# Patient Record
Sex: MALE
[De-identification: names, ages, dates, MRNs, and addresses within clinical notes are randomized per-mention and may not be internally consistent; named-entity substitution may affect disease eponyms.]

---
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
## 2023-10-28 ENCOUNTER — APPOINTMENT (OUTPATIENT)
Dept: MRI IMAGING | Facility: HOSPITAL | Age: 18
End: 2023-10-28
Attending: EMERGENCY MEDICINE
Payer: COMMERCIAL

## 2023-10-28 ENCOUNTER — APPOINTMENT (OUTPATIENT)
Dept: GENERAL RADIOLOGY | Facility: HOSPITAL | Age: 18
End: 2023-10-28
Attending: EMERGENCY MEDICINE
Payer: COMMERCIAL

## 2023-10-28 ENCOUNTER — HOSPITAL ENCOUNTER (EMERGENCY)
Facility: HOSPITAL | Age: 18
Discharge: HOME | End: 2023-10-28
Attending: EMERGENCY MEDICINE
Payer: COMMERCIAL

## 2023-10-28 VITALS
OXYGEN SATURATION: 98 % | HEART RATE: 70 BPM | BODY MASS INDEX: 21 KG/M2 | HEIGHT: 71 IN | RESPIRATION RATE: 17 BRPM | WEIGHT: 150 LBS | DIASTOLIC BLOOD PRESSURE: 61 MMHG | SYSTOLIC BLOOD PRESSURE: 130 MMHG | TEMPERATURE: 99 F

## 2023-10-28 DIAGNOSIS — R20.2 PARESTHESIAS: ICD-10-CM

## 2023-10-28 DIAGNOSIS — R00.2 PALPITATIONS: ICD-10-CM

## 2023-10-28 DIAGNOSIS — G43.909 MIGRAINE WITHOUT STATUS MIGRAINOSUS, NOT INTRACTABLE, UNSPECIFIED MIGRAINE TYPE: Primary | ICD-10-CM

## 2023-10-28 LAB
ALBUMIN SERPL-MCNC: 4.4 G/DL (ref 3.4–5)
ALBUMIN/GLOB SERPL: 1.3 {RATIO} (ref 1–2)
ALP LIVER SERPL-CCNC: 60 U/L
ALT SERPL-CCNC: 57 U/L
ANION GAP SERPL CALC-SCNC: 6 MMOL/L (ref 0–18)
AST SERPL-CCNC: 27 U/L (ref 15–37)
BASOPHILS # BLD AUTO: 0.02 X10(3) UL (ref 0–0.2)
BASOPHILS NFR BLD AUTO: 0.3 %
BILIRUB SERPL-MCNC: 1.1 MG/DL (ref 0.1–2)
BUN BLD-MCNC: 9 MG/DL (ref 7–18)
CALCIUM BLD-MCNC: 9.4 MG/DL (ref 8.5–10.1)
CHLORIDE SERPL-SCNC: 103 MMOL/L (ref 98–112)
CO2 SERPL-SCNC: 27 MMOL/L (ref 21–32)
CREAT BLD-MCNC: 1.03 MG/DL
EGFRCR SERPLBLD CKD-EPI 2021: 107 ML/MIN/1.73M2 (ref 60–?)
EOSINOPHIL # BLD AUTO: 0.25 X10(3) UL (ref 0–0.7)
EOSINOPHIL NFR BLD AUTO: 4 %
ERYTHROCYTE [DISTWIDTH] IN BLOOD BY AUTOMATED COUNT: 11.2 %
GLOBULIN PLAS-MCNC: 3.4 G/DL (ref 2.8–4.4)
GLUCOSE BLD-MCNC: 89 MG/DL (ref 70–99)
HCT VFR BLD AUTO: 44.3 %
HGB BLD-MCNC: 15.9 G/DL
IMM GRANULOCYTES # BLD AUTO: 0.01 X10(3) UL (ref 0–1)
IMM GRANULOCYTES NFR BLD: 0.2 %
LYMPHOCYTES # BLD AUTO: 2.41 X10(3) UL (ref 1–4)
LYMPHOCYTES NFR BLD AUTO: 38.6 %
MCH RBC QN AUTO: 30.6 PG (ref 26–34)
MCHC RBC AUTO-ENTMCNC: 35.9 G/DL (ref 31–37)
MCV RBC AUTO: 85.4 FL
MONOCYTES # BLD AUTO: 0.48 X10(3) UL (ref 0.1–1)
MONOCYTES NFR BLD AUTO: 7.7 %
NEUTROPHILS # BLD AUTO: 3.07 X10 (3) UL (ref 1.5–7.7)
NEUTROPHILS # BLD AUTO: 3.07 X10(3) UL (ref 1.5–7.7)
NEUTROPHILS NFR BLD AUTO: 49.2 %
OSMOLALITY SERPL CALC.SUM OF ELEC: 280 MOSM/KG (ref 275–295)
PLATELET # BLD AUTO: 267 10(3)UL (ref 150–450)
POTASSIUM SERPL-SCNC: 3.4 MMOL/L (ref 3.5–5.1)
PROT SERPL-MCNC: 7.8 G/DL (ref 6.4–8.2)
RBC # BLD AUTO: 5.19 X10(6)UL
SODIUM SERPL-SCNC: 136 MMOL/L (ref 136–145)
TROPONIN I HIGH SENSITIVITY: 4 NG/L
TSI SER-ACNC: 1.25 MIU/ML (ref 0.36–3.74)
WBC # BLD AUTO: 6.2 X10(3) UL (ref 4–11)

## 2023-10-28 PROCEDURE — 99285 EMERGENCY DEPT VISIT HI MDM: CPT

## 2023-10-28 PROCEDURE — 71045 X-RAY EXAM CHEST 1 VIEW: CPT | Performed by: EMERGENCY MEDICINE

## 2023-10-28 PROCEDURE — 84443 ASSAY THYROID STIM HORMONE: CPT | Performed by: EMERGENCY MEDICINE

## 2023-10-28 PROCEDURE — 84484 ASSAY OF TROPONIN QUANT: CPT | Performed by: EMERGENCY MEDICINE

## 2023-10-28 PROCEDURE — 70546 MR ANGIOGRAPH HEAD W/O&W/DYE: CPT | Performed by: EMERGENCY MEDICINE

## 2023-10-28 PROCEDURE — 70549 MR ANGIOGRAPH NECK W/O&W/DYE: CPT | Performed by: EMERGENCY MEDICINE

## 2023-10-28 PROCEDURE — 93005 ELECTROCARDIOGRAM TRACING: CPT

## 2023-10-28 PROCEDURE — 80053 COMPREHEN METABOLIC PANEL: CPT | Performed by: EMERGENCY MEDICINE

## 2023-10-28 PROCEDURE — 70553 MRI BRAIN STEM W/O & W/DYE: CPT | Performed by: EMERGENCY MEDICINE

## 2023-10-28 PROCEDURE — 93010 ELECTROCARDIOGRAM REPORT: CPT

## 2023-10-28 PROCEDURE — 85025 COMPLETE CBC W/AUTO DIFF WBC: CPT | Performed by: EMERGENCY MEDICINE

## 2023-10-28 PROCEDURE — A9575 INJ GADOTERATE MEGLUMI 0.1ML: HCPCS

## 2023-10-28 PROCEDURE — 36415 COLL VENOUS BLD VENIPUNCTURE: CPT

## 2023-10-28 RX ORDER — GADOTERATE MEGLUMINE 376.9 MG/ML
15 INJECTION INTRAVENOUS
Status: COMPLETED | OUTPATIENT
Start: 2023-10-28 | End: 2023-10-28

## 2023-10-28 NOTE — ED INITIAL ASSESSMENT (HPI)
Pt reports an hour and a half ago while at work feeling his left side of his face felt numb and feeling palpitations. Pt states numbness to left side of his face, arm and shoulder lasted about 20 minutes. Denies numbness or tingling at this time. Denies any pain, A/Ox4 clear speech.

## 2023-10-29 LAB
ATRIAL RATE: 78 BPM
P AXIS: 43 DEGREES
P-R INTERVAL: 158 MS
Q-T INTERVAL: 360 MS
QRS DURATION: 106 MS
QTC CALCULATION (BEZET): 410 MS
R AXIS: 78 DEGREES
T AXIS: 52 DEGREES
VENTRICULAR RATE: 78 BPM

## 2024-02-14 ENCOUNTER — TELEPHONE (OUTPATIENT)
Dept: NEUROLOGY | Facility: CLINIC | Age: 19
End: 2024-02-14

## 2024-07-21 LAB
ALBUMIN SERPL-MCNC: 4.8 G/DL (ref 3.2–4.8)
ALBUMIN/GLOB SERPL: 1.7 {RATIO} (ref 1–2)
ALP LIVER SERPL-CCNC: 67 U/L
ALT SERPL-CCNC: 27 U/L
ANION GAP SERPL CALC-SCNC: 5 MMOL/L (ref 0–18)
AST SERPL-CCNC: 27 U/L (ref ?–34)
BASOPHILS # BLD AUTO: 0.04 X10(3) UL (ref 0–0.2)
BASOPHILS NFR BLD AUTO: 0.6 %
BILIRUB SERPL-MCNC: 1.2 MG/DL (ref 0.3–1.2)
BUN BLD-MCNC: 8 MG/DL (ref 9–23)
CALCIUM BLD-MCNC: 9.4 MG/DL (ref 8.7–10.4)
CHLORIDE SERPL-SCNC: 104 MMOL/L (ref 98–112)
CO2 SERPL-SCNC: 29 MMOL/L (ref 21–32)
CREAT BLD-MCNC: 1.18 MG/DL
EGFRCR SERPLBLD CKD-EPI 2021: 90 ML/MIN/1.73M2 (ref 60–?)
EOSINOPHIL # BLD AUTO: 0.27 X10(3) UL (ref 0–0.7)
EOSINOPHIL NFR BLD AUTO: 4.2 %
ERYTHROCYTE [DISTWIDTH] IN BLOOD BY AUTOMATED COUNT: 11.5 %
GLOBULIN PLAS-MCNC: 2.8 G/DL (ref 2.8–4.4)
GLUCOSE BLD-MCNC: 92 MG/DL (ref 70–99)
HCT VFR BLD AUTO: 46.3 %
HGB BLD-MCNC: 16.4 G/DL
IMM GRANULOCYTES # BLD AUTO: 0.02 X10(3) UL (ref 0–1)
IMM GRANULOCYTES NFR BLD: 0.3 %
LYMPHOCYTES # BLD AUTO: 2.27 X10(3) UL (ref 1–4)
LYMPHOCYTES NFR BLD AUTO: 35.1 %
MCH RBC QN AUTO: 31.2 PG (ref 26–34)
MCHC RBC AUTO-ENTMCNC: 35.4 G/DL (ref 31–37)
MCV RBC AUTO: 88.2 FL
MONOCYTES # BLD AUTO: 0.45 X10(3) UL (ref 0.1–1)
MONOCYTES NFR BLD AUTO: 7 %
NEUTROPHILS # BLD AUTO: 3.41 X10 (3) UL (ref 1.5–7.7)
NEUTROPHILS # BLD AUTO: 3.41 X10(3) UL (ref 1.5–7.7)
NEUTROPHILS NFR BLD AUTO: 52.8 %
OSMOLALITY SERPL CALC.SUM OF ELEC: 284 MOSM/KG (ref 275–295)
PLATELET # BLD AUTO: 288 10(3)UL (ref 150–450)
POTASSIUM SERPL-SCNC: 4.3 MMOL/L (ref 3.5–5.1)
PROT SERPL-MCNC: 7.6 G/DL (ref 5.7–8.2)
RBC # BLD AUTO: 5.25 X10(6)UL
SODIUM SERPL-SCNC: 138 MMOL/L (ref 136–145)
WBC # BLD AUTO: 6.5 X10(3) UL (ref 4–11)

## 2024-07-21 PROCEDURE — 80053 COMPREHEN METABOLIC PANEL: CPT | Performed by: EMERGENCY MEDICINE

## 2024-07-21 PROCEDURE — 80053 COMPREHEN METABOLIC PANEL: CPT

## 2024-07-21 PROCEDURE — 85025 COMPLETE CBC W/AUTO DIFF WBC: CPT | Performed by: EMERGENCY MEDICINE

## 2024-07-21 PROCEDURE — 36415 COLL VENOUS BLD VENIPUNCTURE: CPT

## 2024-07-21 PROCEDURE — 99285 EMERGENCY DEPT VISIT HI MDM: CPT

## 2024-07-21 PROCEDURE — 99284 EMERGENCY DEPT VISIT MOD MDM: CPT

## 2024-07-21 PROCEDURE — 85025 COMPLETE CBC W/AUTO DIFF WBC: CPT

## 2024-07-22 ENCOUNTER — HOSPITAL ENCOUNTER (EMERGENCY)
Facility: HOSPITAL | Age: 19
Discharge: HOME | End: 2024-07-22
Attending: EMERGENCY MEDICINE
Payer: COMMERCIAL

## 2024-07-22 ENCOUNTER — APPOINTMENT (OUTPATIENT)
Dept: CT IMAGING | Facility: HOSPITAL | Age: 19
End: 2024-07-22
Attending: EMERGENCY MEDICINE
Payer: COMMERCIAL

## 2024-07-22 VITALS
SYSTOLIC BLOOD PRESSURE: 114 MMHG | RESPIRATION RATE: 14 BRPM | DIASTOLIC BLOOD PRESSURE: 75 MMHG | BODY MASS INDEX: 20.99 KG/M2 | HEART RATE: 70 BPM | OXYGEN SATURATION: 100 % | HEIGHT: 72 IN | WEIGHT: 155 LBS | TEMPERATURE: 98 F

## 2024-07-22 DIAGNOSIS — R51.9 ACUTE NONINTRACTABLE HEADACHE, UNSPECIFIED HEADACHE TYPE: Primary | ICD-10-CM

## 2024-07-22 PROCEDURE — 70450 CT HEAD/BRAIN W/O DYE: CPT | Performed by: EMERGENCY MEDICINE

## 2024-07-22 NOTE — ED INITIAL ASSESSMENT (HPI)
Patient here with c/o burning sensation in his scalp along with nausea and sensitivity to lights.  Patient reports he has been dealing with symptoms on and off x 1 month.

## 2024-07-22 NOTE — ED PROVIDER NOTES
Patient Seen in: [facility] Emergency Department      History     Chief Complaint   Patient presents with    Headache     Stated Complaint: headache and feeling delayed speech issues x 1month , headache increased at 180*    Subjective:   HPI    19-year-old male presenting with headache.  He is complaining to a headache to the top of his head bilaterally frontal region he says he has had no sinus pressure no sinus pain no sinus drainage no sore throat fevers chills he says he knows little blurry vision when he was looking at lights he is concerned about her prompting his visit here he denies worst headache feeling of life he denies any trauma he denies any other exacerbating or relieving factors or associated symptoms    Objective:   Past Medical History:    Anesthesia complication    HARD TO AWAKEN    Anxiety    Anxiety state    Asthma (HCC)    Asthma (HCC)    Seasonal allergies and excercise induced asthma    Depression    Esophageal reflux    Esophagitis    IBS (irritable bowel syndrome)    Migraines    Problems with swallowing    depends of food due EOE    Seasonal allergies    Ulcer of abdomen wall (HCC)              Past Surgical History:   Procedure Laterality Date    Colonoscopy N/A 10/4/2019    Procedure: COLONOSCOPY;  Surgeon: Humberto Carreon MD;  Location:  ENDOSCOPY    Upper gi endoscopy,exam      X3                Social History     Socioeconomic History    Marital status: Single   Tobacco Use    Smoking status: Never    Smokeless tobacco: Current     Types: Chew   Vaping Use    Vaping status: Every Day    Start date: 3/10/2019    Last attempt to quit: 9/10/2019   Substance and Sexual Activity    Alcohol use: Yes     Alcohol/week: 2.0 standard drinks of alcohol     Types: 2 Cans of beer per week     Comment: OCCAS    Drug use: Not Currently     Types: Cannabis   Social History Narrative    ** Merged History Encounter **          Social Determinants of Health      Received from  [facility]              Review of Systems    Positive for stated Chief Complaint: Headache    Other systems are as noted in HPI.  Constitutional and vital signs reviewed.      All other systems reviewed and negative except as noted above.    Physical Exam     ED Triage Vitals   BP 07/21/24 2242 (!) 166/91   Pulse 07/21/24 2242 83   Resp 07/21/24 2242 16   Temp 07/21/24 2242 98.2 °F (36.8 °C)   Temp src --    SpO2 07/21/24 2242 98 %   O2 Device 07/22/24 0039 None (Room air)       Current Vitals:   Vital Signs  BP: (!) 166/91  Pulse: 83  Resp: 16  Temp: 98.2 °F (36.8 °C)    Oxygen Therapy  SpO2: 98 %  O2 Device: None (Room air)            Physical Exam  Generally the patient is alert and oriented ×3 and appears in no distress  HEENT exam: Tympanic membranes are clear.  Canals are normal with no auricular preauricular or mastoid tenderness.  Oropharyngeal exam shows no uvula edema or shift.  There is no tongue elevation and palatine tonsils show no purulent material or erythema.  No submandibular erythema and no tenderness along the sternocleidomastoid and no nuchal rigidity  Lungs are clear to auscultation bilaterally with no wheezes retractions or rhonchi noted  Cardiovascular exam shows a regular rate and rhythm  Abdomen soft nontender with no rebound tenderness noted  Extremity exam shows no clubbing cyanosis or edema  Skin exam shows no rashes or lacerations  Neuro exam shows no focal deficits cranial nerves II through XII intact with no pronator drift  Back exam shows no tenderness       ED Course     Labs Reviewed   COMP METABOLIC PANEL (14) - Abnormal; Notable for the following components:       Result Value    BUN 8 (*)     All other components within normal limits   CBC WITH DIFFERENTIAL WITH PLATELET    Narrative:     The following orders were created for panel order CBC With Differential With Platelet.  Procedure                               Abnormality         Status                      ---------                               -----------         ------                     CBC W/ DIFFERENTIAL[[id number]]                              Final result                 Please view results for these tests on the individual orders.   RAINBOW DRAW LAVENDER   RAINBOW DRAW LIGHT GREEN   CBC W/ DIFFERENTIAL             Differential diagnosis includes subarachnoid hemorrhage mass         MDM                                         Medical Decision Making  19-year-old male presenting with headache IV established cardiac monitor shows a sinus rhythm pulse ox shows no signs of hypoxia CBC shows stable hemoglobin level metabolic panel stable renal function independent interpretation by ED physician of CT scan of the brain shows no acute hemorrhage patient has no signs neurologic deficits here in the emergency department was referred to neurology for further evaluation is to return emerged part worsening symptoms other complaints the patient was screened and evaluated during this visit.  As a treating physician attending to the patient, I determined, within reasonable clinical confidence and prior to discharge, that an emergency medical condition was not or was no longer present.  There was no indication for further evaluation, treatment or admission on an emergency basis.    The usual and customary discharge instructions were discussed given the patient's ER course.  We discussed signs and symptoms that should prompt the patient's immediate return to the emergency department.  Reasonable over-the-counter and prescription treatment options and physician follow-up plan was discussed.  Patient was discharged home in good condition  This note was prepared using Dragon Medical voice recognition dictation software.  As a result errors may occur.  When identified to these areas have been corrected.  While every attempt is made to correct errors during dictation discrepancies may still exist.  Please contact if  there are any errors      Problems Addressed:  Acute nonintractable headache, unspecified headache type: acute illness or injury    Amount and/or Complexity of Data Reviewed  Labs: ordered. Decision-making details documented in ED Course.  Radiology: ordered and independent interpretation performed. Decision-making details documented in ED Course.  ECG/medicine tests: ordered and independent interpretation performed. Decision-making details documented in ED Course.        Disposition and Plan     Clinical Impression:  1. Acute nonintractable headache, unspecified headache type         Disposition:  Discharge  7/22/2024  1:18 am    Follow-up:  Ian Retana MD  [facility] [address] [place] [id number]  [phone]    Follow up in 1 week(s)            Medications Prescribed:  Current Discharge Medication List

## 2024-07-24 ENCOUNTER — OFFICE VISIT (OUTPATIENT)
Dept: NEUROLOGY | Facility: CLINIC | Age: 19
End: 2024-07-24
Payer: COMMERCIAL

## 2024-07-24 VITALS
WEIGHT: 143.69 LBS | HEART RATE: 74 BPM | BODY MASS INDEX: 19 KG/M2 | RESPIRATION RATE: 10 BRPM | DIASTOLIC BLOOD PRESSURE: 68 MMHG | SYSTOLIC BLOOD PRESSURE: 112 MMHG

## 2024-07-24 DIAGNOSIS — G43.019 INTRACTABLE MIGRAINE WITHOUT AURA AND WITHOUT STATUS MIGRAINOSUS: ICD-10-CM

## 2024-07-24 DIAGNOSIS — M54.81 BILATERAL OCCIPITAL NEURALGIA: Primary | ICD-10-CM

## 2024-07-24 PROCEDURE — 99204 OFFICE O/P NEW MOD 45 MIN: CPT | Performed by: OTHER

## 2024-07-24 RX ORDER — BUTALBITAL, ACETAMINOPHEN AND CAFFEINE 50; 325; 40 MG/1; MG/1; MG/1
TABLET ORAL
Qty: 15 TABLET | Refills: 1 | Status: SHIPPED | OUTPATIENT
Start: 2024-07-24

## 2024-07-24 RX ORDER — NORTRIPTYLINE HYDROCHLORIDE 10 MG/1
CAPSULE ORAL
Qty: 60 CAPSULE | Refills: 0 | Status: SHIPPED | OUTPATIENT
Start: 2024-07-24

## 2024-07-24 NOTE — PROGRESS NOTES
[facility]   Neurology    Daniel Anderson Patient Status:  No patient class for patient encounter    2005 MRN [id number]   Location [facility] [place] PCP Prabha Santana MD              HPI:   Daniel Anderson is a(n) 19 year old male with history of esophageal ulcers, asthma, allergies, who presents at the request of Dr. Fortune for evaluation of headaches. Reports in the past had sharp pain that was throbbing, unilateral, associated with photophobia and phonophobia, gets these a few times a year. Current headache started a month ago. Is more burning, prick-like, midline radiates from the occipital ridge area to the top of the head. Does have light sensitivity. Sometimes the headache throbs. Intensifies for 3 hours at a time, usually at night, between 12 or 1am, wakes him up. Lays down at around 10am. Occasionally gets in the morning when wakes up, or after he takes a nap during the day. No changes in pillows. No recent haircuts. Overall sleep is usually good. When headache flares up, near vision gets blurry, both eyes, hard time reading. Also then has a hard time process thoughts, starts stuttering. Appetite has decreased. When head pain flares up, builds slowly and develops light and sound sensitivity, and wants to lay down.    Pertinent imaging and laboratory work-up:   CT head 2024  CONCLUSION:  No significant midline shift or mass effect.  No acute intracranial hemorrhage.  If there is persistent clinical concern then consider MRI.     MRI MRIA H and N 10/2023  CONCLUSION:  Unremarkable MRI of the brain.      No MR evidence for acute intracranial infarction.      No evidence of large occlusion, aneurysm carotid or vertebral artery stenosis         Past Medical History:  Past Medical History:    Anesthesia complication    HARD TO AWAKEN    Anxiety    Anxiety state    Asthma (HCC)    Asthma (HCC)    Seasonal allergies and excercise induced asthma     Depression    Esophageal reflux    Esophagitis    IBS (irritable bowel syndrome)    Migraines    Problems with swallowing    depends of food due EOE    Seasonal allergies    Ulcer of abdomen wall (HCC)        Past Surgical History:  Past Surgical History:   Procedure Laterality Date    Colonoscopy N/A 10/4/2019    Procedure: COLONOSCOPY;  Surgeon: Humberto Carreon MD;  Location:  ENDOSCOPY    Upper gi endoscopy,exam      X3       Family History:  family history is not on file.  Mother has migraines    Social History:   reports that he has never smoked. His smokeless tobacco use includes chew. He reports current alcohol use of about 2.0 standard drinks of alcohol per week. He reports that he does not currently use drugs after having used the following drugs: Cannabis.    Allergies:  Allergies   Allergen Reactions    Peanuts ANAPHYLAXIS    Peanuts HIVES    Soybean Allergy HIVES    Tree Nuts ANAPHYLAXIS    Tree Nuts HIVES       MEDICATIONS:    Current Outpatient Medications:     Cetirizine HCl (ZYRTEC ALLERGY) 10 MG Oral Cap, Take by mouth as needed., Disp: , Rfl:     Albuterol Sulfate  (90 Base) MCG/ACT Inhalation Aero Soln, Inhale 1 puff into the lungs every 6 (six) hours as needed for Wheezing., Disp: , Rfl:       Review of Systems:   A comprehensive 10 point review of systems was completed.     Pertinent positives and negatives noted in the HPI.         PHYSICAL EXAM:   Neurologic Exam  Vitals  Vitals:    07/24/24 0914   BP: 112/68   Pulse: 74   Resp: 10     General Appearance: Patient is a 19 year old male in no acute distress  Cardiac: Normal rate & regular rhythm  Skin: There are no rashes or other skin lesions.  Musculoskeletal: There is no scoliosis, or joint deformities  Neurologic examination:  Mental status: Patient is alert, attentive, and oriented x 3. Language is coherent and fluent without aphasia. Memory, comprehension and ability to follow commands were intact.   Cranial nerves  II-XII: Optic discs were sharp. Pupils were round and reacted to light. Extraocular movements were full. There was no face, jaw, palate or tongue weakness or atrophy. Facial sensation was normal. Hearing was grossly intact. Shoulder shrug was normal.   Motor exam revealed normal muscle bulk and tone. No atrophy or fasciculations. Manual muscle testing revealed MRC grade 5/5 strength throughout including proximal and distal muscles of the arms and legs.  Deep tendon reflexes were 2 at the biceps, brachioradialis, triceps, knee jerk, and ankle jerk. Plantar responses were flexor bilaterally.   Sensory exam revealed normal light touch perception. Vibratory perception and proprioception were intact at the toes. Pinprick and temperature were normal. Romberg sign was absent.  Complex motor skills revealed normal coordination. Finger-nose-finger intact.   Gait was narrow and stable, was able to walk on heels, toes and tandem without any difficulty.     ASSESSMENT/ACTIVE PROBLEM LIST:     Encounter Diagnoses   Name Primary?    Bilateral occipital neuralgia Yes    Intractable migraine without aura and without status migrainosus        Discussion/Plan:  Atypical occipital neuralgia with migraine features, or secondarily triggered migraines from the neuralgiform headaches. Neuralgia is likely primary, and \"flare ups\" are likely migraines  No structural abnormality on CT head  Start nortriptyline 20mg nightly  If headaches do not improve with medication treatment, will recommend MRI brain   Recommend fiorcet to try as abortive  Counseled on medication overuse headache and prevention with rescue medications being taken not more than 2-3 times a week  HA diary  Educated on triggers and lifestyle behaviors for migraines including limiting caffeine intake, not skipping meals, having regular sleep schedule and practicing good sleep hygiene, avoid migraine food triggers/try to identify if any of them are triggers (nitrates, aged  cheeses, red wine, chocolate, msg, artificial sweetener)    C c Dr. Fortune     Requested Prescriptions      No prescriptions requested or ordered in this encounter          We discussed in depth regarding the diagnosis, prognosis, treatment. The patient was given ample opportunity to ask questions. All questions and concerns were addressed.     Baryb Reynolds DO  Neuromuscular and General Neurology  [facility]

## 2024-07-24 NOTE — PATIENT INSTRUCTIONS
Refill policies:    Allow 2-3 business days for refills; controlled substances may take longer.  Contact your pharmacy at least 5 days prior to running out of medication and have them send an electronic request or submit request through the “request refill” option in your [employer] account.  Refills are not addressed on weekends; covering physicians do not authorize routine medications on weekends.  No narcotics or controlled substances are refilled after noon on Fridays or by on call physicians.  By law, narcotics must be electronically prescribed.  A 30 day supply with no refills is the maximum allowed.  If your prescription is due for a refill, you may be due for a follow up appointment.  To best provide you care, patients receiving routine medications need to be seen at least once a year.  Patients receiving narcotic/controlled substance medications need to be seen at least once every 3 months.  In the event that your preferred pharmacy does not have the requested medication in stock (e.g. Backordered), it is your responsibility to find another pharmacy that has the requested medication available.  We will gladly send a new prescription to that pharmacy at your request.    Scheduling Tests:    If your physician has ordered radiology tests such as MRI or CT scans, please contact Central Scheduling at [phone] right away to schedule the test.  Once scheduled, the [facility] Centralized Referral Team will work with your insurance carrier to obtain pre-certification or prior authorization.  Depending on your insurance carrier, approval may take 3-10 days.  It is highly recommended patients assure they have received an authorization before having a test performed.  If test is done without insurance authorization, patient may be responsible for the entire amount billed.      Precertification and Prior Authorizations:  If your physician has recommended that you have a procedure or additional testing performed the [facility]  Centralized Referral Team will contact your insurance carrier to obtain pre-certification or prior authorization.    You are strongly encouraged to contact your insurance carrier to verify that your procedure/test has been approved and is a COVERED benefit.  Although the [facility] Centralized Referral Team does its due diligence, the insurance carrier gives the disclaimer that \"Although the procedure is authorized, this does not guarantee payment.\"    Ultimately the patient is responsible for payment.   Thank you for your understanding in this matter.  Paperwork Completion:  If you require FMLA or disability paperwork for your recovery, please make sure to either drop it off or have it faxed to our office at [phone]. Be sure the form has your name and date of birth on it.  The form will be faxed to our Forms Department and they will complete it for you.  There is a 25$ fee for all forms that need to be filled out.  Please be aware there is a 10-14 day turnaround time.  You will need to sign a release of information (MYRON) form if your paperwork does not come with one.  You may call the Forms Department with any questions at [phone].  Their fax number is [phone].

## 2024-07-24 NOTE — PROGRESS NOTES
Daily headache x one month \"comes and goes\" Has tried tylenol and motrin with no relief. Headaches beginning affect speech and vision

## 2024-09-23 ENCOUNTER — HOSPITAL ENCOUNTER (OUTPATIENT)
Facility: HOSPITAL | Age: 19
Setting detail: HOSPITAL OUTPATIENT SURGERY
Discharge: HOME | End: 2024-09-23
Attending: PEDIATRICS | Admitting: PEDIATRICS
Payer: COMMERCIAL

## 2024-09-23 ENCOUNTER — ANESTHESIA (OUTPATIENT)
Dept: ENDOSCOPY | Facility: HOSPITAL | Age: 19
End: 2024-09-23
Payer: COMMERCIAL

## 2024-09-23 ENCOUNTER — ANESTHESIA EVENT (OUTPATIENT)
Dept: ENDOSCOPY | Facility: HOSPITAL | Age: 19
End: 2024-09-23
Payer: COMMERCIAL

## 2024-09-23 VITALS
RESPIRATION RATE: 16 BRPM | DIASTOLIC BLOOD PRESSURE: 46 MMHG | SYSTOLIC BLOOD PRESSURE: 109 MMHG | HEIGHT: 72 IN | WEIGHT: 165 LBS | BODY MASS INDEX: 22.35 KG/M2 | HEART RATE: 76 BPM | OXYGEN SATURATION: 100 % | TEMPERATURE: 98 F

## 2024-09-23 PROCEDURE — 0DB78ZX EXCISION OF STOMACH, PYLORUS, VIA NATURAL OR ARTIFICIAL OPENING ENDOSCOPIC, DIAGNOSTIC: ICD-10-PCS | Performed by: PEDIATRICS

## 2024-09-23 PROCEDURE — 0DB98ZX EXCISION OF DUODENUM, VIA NATURAL OR ARTIFICIAL OPENING ENDOSCOPIC, DIAGNOSTIC: ICD-10-PCS | Performed by: PEDIATRICS

## 2024-09-23 PROCEDURE — 88305 TISSUE EXAM BY PATHOLOGIST: CPT | Performed by: PEDIATRICS

## 2024-09-23 PROCEDURE — 0DB38ZX EXCISION OF LOWER ESOPHAGUS, VIA NATURAL OR ARTIFICIAL OPENING ENDOSCOPIC, DIAGNOSTIC: ICD-10-PCS | Performed by: PEDIATRICS

## 2024-09-23 PROCEDURE — 0DB18ZX EXCISION OF UPPER ESOPHAGUS, VIA NATURAL OR ARTIFICIAL OPENING ENDOSCOPIC, DIAGNOSTIC: ICD-10-PCS | Performed by: PEDIATRICS

## 2024-09-23 PROCEDURE — 0DB28ZX EXCISION OF MIDDLE ESOPHAGUS, VIA NATURAL OR ARTIFICIAL OPENING ENDOSCOPIC, DIAGNOSTIC: ICD-10-PCS | Performed by: PEDIATRICS

## 2024-09-23 RX ORDER — SODIUM CHLORIDE, SODIUM LACTATE, POTASSIUM CHLORIDE, CALCIUM CHLORIDE 600; 310; 30; 20 MG/100ML; MG/100ML; MG/100ML; MG/100ML
INJECTION, SOLUTION INTRAVENOUS CONTINUOUS
Status: DISCONTINUED | OUTPATIENT
Start: 2024-09-23 | End: 2024-09-23

## 2024-09-23 RX ORDER — NALOXONE HYDROCHLORIDE 0.4 MG/ML
0.08 INJECTION, SOLUTION INTRAMUSCULAR; INTRAVENOUS; SUBCUTANEOUS ONCE AS NEEDED
Status: DISCONTINUED | OUTPATIENT
Start: 2024-09-23 | End: 2024-09-23

## 2024-09-23 RX ADMIN — SODIUM CHLORIDE, SODIUM LACTATE, POTASSIUM CHLORIDE, CALCIUM CHLORIDE: 600; 310; 30; 20 INJECTION, SOLUTION INTRAVENOUS at 09:54:00

## 2024-09-23 NOTE — OPERATIVE REPORT
[facility]    PATIENT'S NAME: DAMASO ARTEAGA   ATTENDING PHYSICIAN: Humberto Carreon M.D.   OPERATING PHYSICIAN: Humberto Carreon M.D.   PATIENT ACCOUNT#:   [id number]    LOCATION:  [address]  MEDICAL RECORD #:   [id number]       YOB: 2005  ADMISSION DATE:       09/23/2024      OPERATION DATE:  09/23/2024    OPERATIVE REPORT      PREOPERATIVE DIAGNOSIS:    1.   Eosinophilic esophagitis.   2.   Dysphagia.  3.   Chest pain.  POSTOPERATIVE DIAGNOSIS:  Esophagitis.  PROCEDURE:  Esophagogastroduodenoscopy.    SEDATION:  Propofol IV.    INDICATIONS:  This is a 19-year-old boy with a known history of eosinophilic esophagitis.  We last saw him several years ago, after which he was lost to followup.  He recently returned to the office with complaints of worsening solid food dysphagia accompanied by odynophagia and chest pain.  We are performing upper GI endoscopy to reassess the status of the patient's eosinophilic esophagitis.      FINDINGS:    1.   Pale, edematous esophagus with vertical mucosal \"furrows\" emanating from the GE junction into proximal esophagus.   2.   Several small distal esophageal mucosal \"breaks,\" suggestive of acid-induced esophageal injury.   3.   Normal stomach and duodenum.    OPERATIVE TECHNIQUE:  After obtaining informed consent, the patient was brought to the GI lab, continuous monitoring instituted, IV sedation administered, and a bite block inserted.  The Olympus videogastroscope was introduced orally into the esophagus.  The esophagus appeared pale and edematous with vertical mucosal \"furrows\" that emanated from the GE junction into the proximal esophagus.  In addition, there were several small distal esophageal mucosal \"breaks,\" suggestive of acid-induced esophageal injury.  The scope was advanced into the stomach.  We advanced the scope to the antrum and retroflexed for visualization of the incisura, cardia, and fundus.  There were no  gastric erosions or ulcerations.  We straightened the scope and advanced it into the duodenal bulb and further distally to the third portion of the duodenum.  There were no duodenal erosions or ulcerations.  Three biopsies were obtained from the duodenum; 3 biopsies from the gastric antrum, incisura and corpus; 3 biopsies from the distal esophagus, 3 biopsies from the mid esophagus, and 3 biopsies from the proximal esophagus.  The scope was withdrawn and the procedure terminated.  There were no complications.       DISPOSITION:    1.   Check biopsies.   2.   Further recommendations await results of the above.     Dictated By Humberto Carreon M.D.  d: 09/23/2024 09:57:29  t: 09/23/2024 14:01:08  [facility] [id number]  [facility]/    cc: CAROL ANN Hook M.D.

## 2024-09-23 NOTE — ANESTHESIA POSTPROCEDURE EVALUATION
[facility]    Daniel Anderson Patient Status:  Hospital Outpatient Surgery   Age/Gender 19 year old male MRN [id number]   Location [facility] ENDOSCOPY PAIN CENTER Attending Humberto Carreon MD   Hosp Day # 0 PCP ARELIS BUCKLEY       Anesthesia Post-op Note    ESOPHAGOGASTRODUODENOSCOPY (EGD) WITH BIOPSIES    Procedure Summary       Date: 09/23/24 Room / Location:  ENDOSCOPY 04 /  ENDOSCOPY    Anesthesia Start: 0936 Anesthesia Stop:     Procedure: ESOPHAGOGASTRODUODENOSCOPY (EGD) WITH BIOPSIES Diagnosis: (ESOPHAGITIS)    Surgeons: Humberto Carreon MD Anesthesiologist: Esperanza Ross DO    Anesthesia Type: MAC ASA Status: 2            Anesthesia Type: MAC    Vitals Value Taken Time   BP 92/52 09/23/24 1002   Temp  09/23/24 1002   Pulse 66 09/23/24 1002   Resp 16 09/23/24 1002   SpO2 97 09/23/24 1002       Patient Location: Endoscopy    Anesthesia Type: MAC    Airway Patency: patent    Postop Pain Control: adequate    Mental Status: mildly sedated but able to meaningfully participate in the post-anesthesia evaluation    Nausea/Vomiting: none    Cardiopulmonary/Hydration status: stable euvolemic    Complications: no apparent anesthesia related complications    Postop vital signs: stable    Dental Exam: Unchanged from Preop    Patient to be discharged home when criteria met.

## 2024-09-23 NOTE — DISCHARGE INSTRUCTIONS
Home Care Instructions for Colonoscopy and/or Gastroscopy with Sedation    Diet:  - Resume your regular diet as tolerated unless otherwise instructed.  - Start with light meals to minimize bloating.  - Do not drink alcohol today.    Medication:  - If you have questions about resuming your normal medications, please contact your Primary Care Physician.    Activities:  - Take it easy today. Do not return to work today.  - Do not drive today.  - Do not operate any machinery today (including kitchen equipment).    Colonoscopy:  - You may notice some rectal \"spotting\" (a little blood on the toilet tissue) for a day or two after the exam. This is normal.  - If you experience any rectal bleeding (not spotting), persistent tenderness or sharp severe abdominal pains, oral temperature over 100 degrees Fahrenheit, light-headedness or dizziness, or any other problems, contact your doctor.    Gastroscopy:  - You may have a sore throat for 2-3 days following the exam. This is normal. Gargling with warm salt water (1/2 tsp salt to 1 glass warm water) or using throat lozenges will help.  - If you experience any sharp pain in your neck, abdomen or chest, vomiting of blood, oral temperature over 100 degrees Fahrenheit, light-headedness or dizziness, or any other problems, contact your doctor.    **If unable to reach your doctor, please go to the [facility] Emergency Room**    - Your referring physician will receive a full report of your examination.  - If you do not hear from your doctor's office within two weeks of your biopsy, please call them for your results.    You may be able to see your laboratory results in [employer] between 4 and 7 business days.  In some cases, your physician may not have viewed the results before they are released to [employer].  If you have questions regarding your results contact the physician who ordered the test/exam by phone or via [employer] by choosing \"Ask a Medical Question.\"

## 2024-09-23 NOTE — ANESTHESIA PREPROCEDURE EVALUATION
PRE-OP EVALUATION    Patient Name: Daniel Anderson    Admit Diagnosis: Dysphagia [R13.10]  Eosinophilic esophagitis [K20.0]    Pre-op Diagnosis: DYSPHAGIA, EOSINOPHILIC ESOPHAGITIS    ESOPHAGOGASTRODUODENOSCOPY (EGD)    Anesthesia Procedure: ESOPHAGOGASTRODUODENOSCOPY (EGD)    Surgeons and Role:     * Humberto Carreon MD - Primary    Pre-op vitals reviewed.  Temp: 98 °F (36.7 °C)  Pulse: 68  Resp: 20  BP: 118/52  SpO2: 99 %  Body mass index is 22.38 kg/m².    Current medications reviewed.  Hospital Medications:   lactated ringers infusion   Intravenous Continuous       Outpatient Medications:     Medications Prior to Admission   Medication Sig Dispense Refill Last Dose    nortriptyline 10 MG Oral Cap Take 1 cap nightly for 5 days, then if tolerating, increase to 2 caps nightly if tolerating (Patient taking differently: Take 1 capsule (10 mg total) by mouth daily. May take additional tablet as needed) 60 capsule 0 Unknown    butalbital-acetaminophen-caffeine -40 MG Oral Tab Take at onset of migraine, can repeat in 4-6 hours. Do not take more than 2-3 times a week 15 tablet 1 More than a month    Cetirizine HCl (ZYRTEC ALLERGY) 10 MG Oral Cap Take by mouth as needed.   Unknown    Albuterol Sulfate  (90 Base) MCG/ACT Inhalation Aero Soln Inhale 1 puff into the lungs every 6 (six) hours as needed for Wheezing.   Unknown       Allergies: Avocado; Lime, lime oil; Peanuts; Peanuts; Soybean allergy; Tree nuts; and Tree nuts      Anesthesia Evaluation    Patient summary reviewed.    Anesthetic Complications           GI/Hepatic/Renal    Negative GI/hepatic/renal ROS.  (+) GERD                      (+) irritable bowel syndrome     Cardiovascular    Negative cardiovascular ROS.  ECG reviewed.  Exercise tolerance: good     MET: >4                                           Endo/Other    Negative endo/other ROS.                              Pulmonary    Negative pulmonary ROS.  (+) asthma                      Neuro/Psych    Negative neuro/psych ROS.  (-) depression             (+) headaches                   Past Surgical History:   Procedure Laterality Date    Colonoscopy N/A 10/4/2019    Procedure: COLONOSCOPY;  Surgeon: Humberto Carreon MD;  Location:  ENDOSCOPY    Upper gi endoscopy,exam      X3     Social History     Socioeconomic History    Marital status: Single   Tobacco Use    Smoking status: Never    Smokeless tobacco: Current     Types: Chew   Vaping Use    Vaping status: Every Day    Start date: 3/10/2019    Last attempt to quit: 9/10/2019    Substances: Nicotine, THC    Devices: Disposable   Substance and Sexual Activity    Alcohol use: Yes     Alcohol/week: 2.0 standard drinks of alcohol     Types: 2 Cans of beer per week     Comment: OCCAS    Drug use: Not Currently     Types: Cannabis     History   Drug Use Unknown     Available pre-op labs reviewed.  Lab Results   Component Value Date    WBC 6.5 07/21/2024    RBC 5.25 07/21/2024    HGB 16.4 07/21/2024    HCT 46.3 07/21/2024    MCV 88.2 07/21/2024    MCH 31.2 07/21/2024    MCHC 35.4 07/21/2024    RDW 11.5 07/21/2024    .0 07/21/2024     Lab Results   Component Value Date     07/21/2024    K 4.3 07/21/2024     07/21/2024    CO2 29.0 07/21/2024    BUN 8 (L) 07/21/2024    CREATSERUM 1.18 07/21/2024    GLU 92 07/21/2024    CA 9.4 07/21/2024            Airway      Mallampati: II  Mouth opening: >3 FB  TM distance: 4 - 6 cm  Neck ROM: full Cardiovascular    Cardiovascular exam normal.  Rhythm: regular  Rate: normal  (-) murmur   Dental    Dentition appears grossly intact         Pulmonary    Pulmonary exam normal.  Breath sounds clear to auscultation bilaterally.               Other findings              ASA: 2   Plan: MAC  NPO status verified and patient meets guidelines.  Patient has not taken beta blockers in last 24 hours.  Post-procedure pain management plan discussed with surgeon and patient.      Plan/risks discussed with:  patient                Present on Admission:  **None**

## 2024-09-23 NOTE — BRIEF OP NOTE
Pre-Operative Diagnosis: DYSPHAGIA, EOSINOPHILIC ESOPHAGITIS, CHEST PAIN     Post-Operative Diagnosis: ESOPHAGITIS      Procedure Performed:   ESOPHAGOGASTRODUODENOSCOPY (EGD) WITH BIOPSIES    Surgeons and Role:     * Humberto Carreon MD - Primary    Assistant(s):        Surgical Findings: esophagitis     Specimen: upper gi biopsies     Estimated Blood Loss: No data recorded    Dictation Number:      Humberto Carreon MD  9/23/2024  9:59 AM

## 2024-09-23 NOTE — H&P
History & Physical Examination    Patient Name: Daniel Anderson  MRN: [id number]  CSN: [id number]  YOB: 2005    Diagnosis: Eosinophilic esophagitis    Present Illness: 18 y/o with past history of eosinophilic esophagitis presenting recently with worsening dysphagia, odynophagia and chest pain.    Medications Prior to Admission   Medication Sig Dispense Refill Last Dose    nortriptyline 10 MG Oral Cap Take 1 cap nightly for 5 days, then if tolerating, increase to 2 caps nightly if tolerating (Patient taking differently: Take 1 capsule (10 mg total) by mouth daily. May take additional tablet as needed) 60 capsule 0 Unknown    butalbital-acetaminophen-caffeine -40 MG Oral Tab Take at onset of migraine, can repeat in 4-6 hours. Do not take more than 2-3 times a week 15 tablet 1 More than a month    Cetirizine HCl (ZYRTEC ALLERGY) 10 MG Oral Cap Take by mouth as needed.   Unknown    Albuterol Sulfate  (90 Base) MCG/ACT Inhalation Aero Soln Inhale 1 puff into the lungs every 6 (six) hours as needed for Wheezing.   Unknown     Current Facility-Administered Medications   Medication Dose Route Frequency    lactated ringers infusion   Intravenous Continuous       Allergies:   Allergies   Allergen Reactions    Avocado Tightness in Throat    Lime, Lime Oil Tightness in Throat    Peanuts ANAPHYLAXIS    Peanuts HIVES    Soybean Allergy HIVES    Tree Nuts ANAPHYLAXIS    Tree Nuts HIVES       Past Medical History:    Anesthesia complication    HARD TO AWAKEN    Anxiety    Anxiety state    Asthma (HCC)    Asthma (HCC)    Seasonal allergies and excercise induced asthma    Depression    Esophageal reflux    Esophagitis    History of stomach ulcers    IBS (irritable bowel syndrome)    Migraines    Problems with swallowing    depends of food due EOE    Seasonal allergies    Ulcer of abdomen wall (HCC)     Past Surgical History:   Procedure Laterality Date    Colonoscopy N/A 10/4/2019    Procedure: COLONOSCOPY;   Surgeon: Humberto Carreon MD;  Location:  ENDOSCOPY    Upper gi endoscopy,exam      X3     History reviewed. No pertinent family history.  Social History     Tobacco Use    Smoking status: Never    Smokeless tobacco: Current     Types: Chew   Substance Use Topics    Alcohol use: Yes     Alcohol/week: 2.0 standard drinks of alcohol     Types: 2 Cans of beer per week     Comment: OCCAS       SYSTEM Check if Review is Normal Check if Physical Exam is Normal If not normal, please explain:   HEENT [x ] x    NECK & BACK x x    HEART x x    LUNGS x x    ABDOMEN x x    UROGENITAL x x    EXTREMITIES x x    OTHER        [ x ] I have discussed the risks and benefits and alternatives with the patient/family.  They understand and agree to proceed with plan of care.  [ x ] I have reviewed the History and Physical done within the last 30 days.  Any changes noted above.    IMP: Eosinophilic esophagitis in 20 y/o now with worsening dysphagia and chest pain.  REC: EGD to reassess esophageal disease activity.    Humberto Carreon MD  9/23/2024  9:39 AM

## 2025-07-07 ENCOUNTER — APPOINTMENT (OUTPATIENT)
Dept: GENERAL RADIOLOGY | Age: 20
End: 2025-07-07
Payer: COMMERCIAL

## 2025-07-07 ENCOUNTER — HOSPITAL ENCOUNTER (EMERGENCY)
Age: 20
Discharge: HOME | End: 2025-07-07
Payer: COMMERCIAL

## 2025-07-07 VITALS
WEIGHT: 176 LBS | SYSTOLIC BLOOD PRESSURE: 125 MMHG | DIASTOLIC BLOOD PRESSURE: 73 MMHG | HEART RATE: 84 BPM | BODY MASS INDEX: 23.84 KG/M2 | OXYGEN SATURATION: 96 % | HEIGHT: 72 IN | TEMPERATURE: 99 F | RESPIRATION RATE: 16 BRPM

## 2025-07-07 DIAGNOSIS — S90.32XA CONTUSION OF LEFT FOOT, INITIAL ENCOUNTER: Primary | ICD-10-CM

## 2025-07-07 PROCEDURE — 73630 X-RAY EXAM OF FOOT: CPT

## 2025-07-07 PROCEDURE — 99283 EMERGENCY DEPT VISIT LOW MDM: CPT

## 2025-07-07 RX ORDER — OMEPRAZOLE 20 MG/1
20 CAPSULE, DELAYED RELEASE ORAL
COMMUNITY
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
## (undated) DEVICE — 3M™ RED DOT™ MONITORING ELECTRODE WITH FOAM TAPE AND STICKY GEL, 50/BAG, 20/CASE, 72/PLT 2570: Brand: RED DOT™

## (undated) DEVICE — Device: Brand: DEFENDO AIR/WATER/SUCTION AND BIOPSY VALVE

## (undated) DEVICE — ENDOSCOPY PACK UPPER: Brand: MEDLINE INDUSTRIES, INC.

## (undated) DEVICE — KIT MFLD FOR SPEC COLL

## (undated) DEVICE — 1200CC GUARDIAN II: Brand: GUARDIAN

## (undated) DEVICE — ENDOSCOPY PACK - LOWER: Brand: MEDLINE INDUSTRIES, INC.

## (undated) DEVICE — FORCEP BIOPSY RJ4 LG CAP W/ND

## (undated) DEVICE — KIT CUSTOM ENDOPROCEDURE STERIS

## (undated) DEVICE — KIT VLV 5 PC AIR H2O SUCT BX ENDOGATOR CONN

## (undated) DEVICE — SINGLE-USE BIOPSY FORCEPS: Brand: RADIAL JAW 4

## (undated) NOTE — LETTER
[address] [place] [address]  Lilian, [address] [place] [address]    Consent for Operation  Date:  March 30, 2020                                Time: _______________    1.  I authorize the performance upon Vince Thompson the following operation:  Procedure(s): procedure has been videotaped, the surgeon will obtain the original videotape. The hospital will not be responsible for storage or maintenance of this tape.   7. For the purpose of advancing medical education, I consent to the admittance of observers to the STATEMENTS REQUIRING INSERTION OR COMPLETION WERE FILLED IN.     Signature of Patient:   ___________________________    When the patient is a minor or mentally incompetent to give consent:  Signature of person authorized to consent for patient: ____________ supplements, and pills I can buy without a prescription (including street drugs/illegal medications). Failure to inform my anesthesiologist about these medicines may increase my risk of anesthetic complications. iv.  If I am allergic to anything or have ha Anesthesiologist Signature     Date   Time  I have discussed the procedure and information above with the patient (or patient’s representative) and answered their questions. The patient or their representative has agreed to have anesthesia services.     ___

## (undated) NOTE — LETTER
[address] [address]  Lilian, [address] [facility]    Consent for Operation  Date: __________________                                Time: _______________    1.  I authorize the performance upon Allison Benavides the following operation:  Procedure( procedure has been videotaped, the surgeon will obtain the original videotape. The hospital will not be responsible for storage or maintenance of this tape.   7. For the purpose of advancing medical education, I consent to the admittance of observers to the STATEMENTS REQUIRING INSERTION OR COMPLETION WERE FILLED IN.     Signature of Patient:   ___________________________    When the patient is a minor or mentally incompetent to give consent:  Signature of person authorized to consent for patient: ____________ supplements, and pills I can buy without a prescription (including street drugs/illegal medications). Failure to inform my anesthesiologist about these medicines may increase my risk of anesthetic complications. iv.  If I am allergic to anything or have ha Anesthesiologist Signature     Date   Time  I have discussed the procedure and information above with the patient (or patient’s representative) and answered their questions. The patient or their representative has agreed to have anesthesia services.     ___

## (undated) NOTE — LETTER
[address] [address]  Lilian, [address] [place] [address]    Consent for Operation  Date: __________________                                Time: _______________    1.  I authorize the performance upon Dulce  the following operation:  Procedure( procedure has been videotaped, the surgeon will obtain the original videotape. The hospital will not be responsible for storage or maintenance of this tape.   7. For the purpose of advancing medical education, I consent to the admittance of observers to the STATEMENTS REQUIRING INSERTION OR COMPLETION WERE FILLED IN.     Signature of Patient:   ___________________________    When the patient is a minor or mentally incompetent to give consent:  Signature of person authorized to consent for patient: ____________ supplements, and pills I can buy without a prescription (including street drugs/illegal medications). Failure to inform my anesthesiologist about these medicines may increase my risk of anesthetic complications. iv.  If I am allergic to anything or have ha Anesthesiologist Signature     Date   Time  I have discussed the procedure and information above with the patient (or patient’s representative) and answered their questions. The patient or their representative has agreed to have anesthesia services.     ___

## (undated) NOTE — ED AVS SNAPSHOT
Heavenly Valencia   MRN: [id number]    Department:  THE [facility] Emergency Department in [place]   Date of Visit:  2/8/2020           Disclosure     Insurance plans vary and the physician(s) referred by the ER may not be covered by your plan.  Please contact tell this physician (or your personal doctor if your instructions are to return to your personal doctor) about any new or lasting problems. The primary care or specialist physician will see patients referred from the [facility] Emergency Department.  Johnathan Light